# Patient Record
Sex: FEMALE
[De-identification: names, ages, dates, MRNs, and addresses within clinical notes are randomized per-mention and may not be internally consistent; named-entity substitution may affect disease eponyms.]

---

## 2019-08-21 ENCOUNTER — HOSPITAL ENCOUNTER (OUTPATIENT)
Dept: HOSPITAL 90 - RAH | Age: 79
Discharge: HOME | End: 2019-08-21
Attending: ORTHOPAEDIC SURGERY
Payer: COMMERCIAL
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
## 2019-08-21 DIAGNOSIS — M17.11: ICD-10-CM
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
## 2019-08-21 DIAGNOSIS — S83.271A: Primary | ICD-10-CM

## 2019-08-21 DIAGNOSIS — M25.461: ICD-10-CM
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
## 2019-08-21 PROCEDURE — 73721 MRI JNT OF LWR EXTRE W/O DYE: CPT

## 2019-10-04 VITALS — SYSTOLIC BLOOD PRESSURE: 152 MMHG | DIASTOLIC BLOOD PRESSURE: 72 MMHG

## 2019-10-04 LAB
APPEARANCE UR: (no result)
BASOPHILS NFR BLD AUTO: 0.9 % (ref 0–5)
BILIRUB UR QL STRIP: NEGATIVE
BUN SERPL-MCNC: 26 MG/DL (ref 7–18)
CHLORIDE SERPL-SCNC: 102 MMOL/L (ref 101–111)
CO2 SERPL-SCNC: 30 MMOL/L (ref 21–32)
COLOR UR: YELLOW
CREAT SERPL-MCNC: 1.4 MG/DL (ref 0.5–1.5)
EOSINOPHIL NFR BLD AUTO: 2.5 % (ref 0–8)
ERYTHROCYTE [DISTWIDTH] IN BLOOD BY AUTOMATED COUNT: 14.6 % (ref 11–15.5)
GFR SERPL CREATININE-BSD FRML MDRD: 39 ML/MIN (ref 60–?)
GLUCOSE SERPL-MCNC: 169 MG/DL (ref 70–105)
GLUCOSE UR STRIP-MCNC: NEGATIVE MG/DL
HCT VFR BLD AUTO: 39.1 % (ref 36–48)
HGB UR QL STRIP: (no result)
INR PPP: 0.93 (ref 0.85–1.15)
KETONES UR STRIP-MCNC: NEGATIVE MG/DL
LEUKOCYTE ESTERASE UR QL STRIP: (no result)
LYMPHOCYTES NFR SPEC AUTO: 16.3 % (ref 21–51)
MCH RBC QN AUTO: 28.1 PG (ref 27–33)
MCHC RBC AUTO-ENTMCNC: 32.9 G/DL (ref 32–36)
MCV RBC AUTO: 85.3 FL (ref 79–99)
MONOCYTES NFR BLD AUTO: 5.4 % (ref 3–13)
NEUTROPHILS NFR BLD AUTO: 74.9 % (ref 40–77)
NITRITE UR QL STRIP: NEGATIVE
NRBC BLD MANUAL-RTO: 0 % (ref 0–0.19)
PH UR STRIP: 6 [PH] (ref 5–8)
PLATELET # BLD AUTO: 204 K/UL (ref 130–400)
POTASSIUM SERPL-SCNC: 4.2 MMOL/L (ref 3.5–5.1)
PROT UR QL STRIP: 30 MG/DL
PROTHROMBIN TIME: 9.8 SEC (ref 9.6–11.6)
RBC # BLD AUTO: 4.59 MIL/UL (ref 4–5.5)
RBC #/AREA URNS HPF: (no result) /HPF (ref 0–1)
SODIUM SERPL-SCNC: 138 MMOL/L (ref 136–145)
SP GR UR STRIP: 1.01 (ref 1–1.03)
UROBILINOGEN UR STRIP-MCNC: 0.2 MG/DL (ref 0.2–1)
WBC # BLD AUTO: 7.1 K/UL (ref 4.8–10.8)
WBC #/AREA URNS HPF: (no result) /HPF (ref 0–1)

## 2019-10-04 NOTE — NUR
UA

INFORMED DR. JASSO OF ABNORMAL UA. ORDERS RECEIVED TO GIVE GENTAMICIN 240MG IV ON AM OF 
PROCEDURE AND SEND URINE FOR CULTURE.

## 2019-10-07 ENCOUNTER — HOSPITAL ENCOUNTER (OUTPATIENT)
Dept: HOSPITAL 90 - DAH | Age: 79
Setting detail: OBSERVATION
LOS: 2 days | Discharge: NURSING, REHAB OR LONG-TERM CARE | End: 2019-10-09
Attending: ORTHOPAEDIC SURGERY | Admitting: ORTHOPAEDIC SURGERY
Payer: COMMERCIAL

## 2019-10-07 VITALS — DIASTOLIC BLOOD PRESSURE: 65 MMHG | SYSTOLIC BLOOD PRESSURE: 132 MMHG

## 2019-10-07 VITALS — DIASTOLIC BLOOD PRESSURE: 60 MMHG | SYSTOLIC BLOOD PRESSURE: 124 MMHG

## 2019-10-07 VITALS — DIASTOLIC BLOOD PRESSURE: 63 MMHG | SYSTOLIC BLOOD PRESSURE: 134 MMHG

## 2019-10-07 VITALS — DIASTOLIC BLOOD PRESSURE: 69 MMHG | SYSTOLIC BLOOD PRESSURE: 133 MMHG

## 2019-10-07 VITALS — DIASTOLIC BLOOD PRESSURE: 57 MMHG | SYSTOLIC BLOOD PRESSURE: 107 MMHG

## 2019-10-07 VITALS — DIASTOLIC BLOOD PRESSURE: 58 MMHG | SYSTOLIC BLOOD PRESSURE: 108 MMHG

## 2019-10-07 VITALS — DIASTOLIC BLOOD PRESSURE: 62 MMHG | SYSTOLIC BLOOD PRESSURE: 111 MMHG

## 2019-10-07 VITALS — DIASTOLIC BLOOD PRESSURE: 71 MMHG | SYSTOLIC BLOOD PRESSURE: 136 MMHG

## 2019-10-07 VITALS — SYSTOLIC BLOOD PRESSURE: 135 MMHG | DIASTOLIC BLOOD PRESSURE: 64 MMHG

## 2019-10-07 VITALS — DIASTOLIC BLOOD PRESSURE: 63 MMHG | SYSTOLIC BLOOD PRESSURE: 139 MMHG

## 2019-10-07 VITALS — DIASTOLIC BLOOD PRESSURE: 62 MMHG | SYSTOLIC BLOOD PRESSURE: 119 MMHG

## 2019-10-07 VITALS — HEIGHT: 66 IN | WEIGHT: 213.4 LBS | BODY MASS INDEX: 34.3 KG/M2

## 2019-10-07 VITALS — DIASTOLIC BLOOD PRESSURE: 64 MMHG | SYSTOLIC BLOOD PRESSURE: 124 MMHG

## 2019-10-07 VITALS — DIASTOLIC BLOOD PRESSURE: 63 MMHG | SYSTOLIC BLOOD PRESSURE: 130 MMHG

## 2019-10-07 VITALS — SYSTOLIC BLOOD PRESSURE: 140 MMHG | DIASTOLIC BLOOD PRESSURE: 65 MMHG

## 2019-10-07 VITALS — SYSTOLIC BLOOD PRESSURE: 115 MMHG | DIASTOLIC BLOOD PRESSURE: 55 MMHG

## 2019-10-07 VITALS — DIASTOLIC BLOOD PRESSURE: 60 MMHG | SYSTOLIC BLOOD PRESSURE: 112 MMHG

## 2019-10-07 VITALS — DIASTOLIC BLOOD PRESSURE: 67 MMHG | SYSTOLIC BLOOD PRESSURE: 134 MMHG

## 2019-10-07 VITALS — DIASTOLIC BLOOD PRESSURE: 54 MMHG | SYSTOLIC BLOOD PRESSURE: 111 MMHG

## 2019-10-07 VITALS — DIASTOLIC BLOOD PRESSURE: 57 MMHG | SYSTOLIC BLOOD PRESSURE: 103 MMHG

## 2019-10-07 VITALS — SYSTOLIC BLOOD PRESSURE: 150 MMHG | DIASTOLIC BLOOD PRESSURE: 70 MMHG

## 2019-10-07 DIAGNOSIS — E78.5: ICD-10-CM

## 2019-10-07 DIAGNOSIS — N18.9: ICD-10-CM

## 2019-10-07 DIAGNOSIS — E66.9: ICD-10-CM

## 2019-10-07 DIAGNOSIS — R06.09: ICD-10-CM

## 2019-10-07 DIAGNOSIS — E11.22: ICD-10-CM

## 2019-10-07 DIAGNOSIS — M17.11: Primary | ICD-10-CM

## 2019-10-07 DIAGNOSIS — Z79.899: ICD-10-CM

## 2019-10-07 DIAGNOSIS — Z91.048: ICD-10-CM

## 2019-10-07 DIAGNOSIS — R60.0: ICD-10-CM

## 2019-10-07 DIAGNOSIS — Z23: ICD-10-CM

## 2019-10-07 DIAGNOSIS — R00.2: ICD-10-CM

## 2019-10-07 DIAGNOSIS — I13.0: ICD-10-CM

## 2019-10-07 DIAGNOSIS — Z79.4: ICD-10-CM

## 2019-10-07 DIAGNOSIS — Z88.0: ICD-10-CM

## 2019-10-07 DIAGNOSIS — I50.32: ICD-10-CM

## 2019-10-07 PROCEDURE — 96372 THER/PROPH/DIAG INJ SC/IM: CPT

## 2019-10-07 PROCEDURE — 87077 CULTURE AEROBIC IDENTIFY: CPT

## 2019-10-07 PROCEDURE — 97161 PT EVAL LOW COMPLEX 20 MIN: CPT

## 2019-10-07 PROCEDURE — 85025 COMPLETE CBC W/AUTO DIFF WBC: CPT

## 2019-10-07 PROCEDURE — 80048 BASIC METABOLIC PNL TOTAL CA: CPT

## 2019-10-07 PROCEDURE — 87641 MR-STAPH DNA AMP PROBE: CPT

## 2019-10-07 PROCEDURE — 96367 TX/PROPH/DG ADDL SEQ IV INF: CPT

## 2019-10-07 PROCEDURE — 96365 THER/PROPH/DIAG IV INF INIT: CPT

## 2019-10-07 PROCEDURE — 82948 REAGENT STRIP/BLOOD GLUCOSE: CPT

## 2019-10-07 PROCEDURE — 88311 DECALCIFY TISSUE: CPT

## 2019-10-07 PROCEDURE — 36415 COLL VENOUS BLD VENIPUNCTURE: CPT

## 2019-10-07 PROCEDURE — 81001 URINALYSIS AUTO W/SCOPE: CPT

## 2019-10-07 PROCEDURE — 87088 URINE BACTERIA CULTURE: CPT

## 2019-10-07 PROCEDURE — 97039 UNLISTED MODALITY: CPT

## 2019-10-07 PROCEDURE — 87186 SC STD MICRODIL/AGAR DIL: CPT

## 2019-10-07 PROCEDURE — 85610 PROTHROMBIN TIME: CPT

## 2019-10-07 PROCEDURE — 96366 THER/PROPH/DIAG IV INF ADDON: CPT

## 2019-10-07 PROCEDURE — 97116 GAIT TRAINING THERAPY: CPT

## 2019-10-07 PROCEDURE — 27447 TOTAL KNEE ARTHROPLASTY: CPT

## 2019-10-07 PROCEDURE — 97530 THERAPEUTIC ACTIVITIES: CPT

## 2019-10-07 PROCEDURE — 88304 TISSUE EXAM BY PATHOLOGIST: CPT

## 2019-10-07 RX ADMIN — CARVEDILOL SCH MG: 3.12 TABLET, FILM COATED ORAL at 21:05

## 2019-10-07 RX ADMIN — SODIUM CHLORIDE SCH UNIT: 9 INJECTION, SOLUTION INTRAVENOUS at 16:30

## 2019-10-07 RX ADMIN — CLINDAMYCIN PHOSPHATE SCH MLS/HR: 18 INJECTION, SOLUTION INTRAVENOUS at 05:00

## 2019-10-07 RX ADMIN — MONTELUKAST SODIUM SCH MG: 10 TABLET, FILM COATED ORAL at 21:04

## 2019-10-07 RX ADMIN — ATORVASTATIN CALCIUM SCH MG: 20 TABLET, FILM COATED ORAL at 21:05

## 2019-10-07 RX ADMIN — PREGABALIN SCH MG: 25 CAPSULE ORAL at 21:04

## 2019-10-07 RX ADMIN — ACETAMINOPHEN SCH MG: 500 TABLET, COATED ORAL at 21:09

## 2019-10-07 RX ADMIN — ACETAMINOPHEN SCH MG: 500 TABLET, COATED ORAL at 15:30

## 2019-10-07 RX ADMIN — CLINDAMYCIN PHOSPHATE SCH: 18 INJECTION, SOLUTION INTRAVENOUS at 13:21

## 2019-10-07 RX ADMIN — SODIUM CHLORIDE SCH MLS/HR: 0.9 INJECTION, SOLUTION INTRAVENOUS at 15:23

## 2019-10-07 RX ADMIN — CLINDAMYCIN PHOSPHATE SCH MLS/HR: 18 INJECTION, SOLUTION INTRAVENOUS at 21:05

## 2019-10-07 RX ADMIN — ASPIRIN SCH MG: 81 TABLET, CHEWABLE ORAL at 21:04

## 2019-10-07 RX ADMIN — AMLODIPINE BESYLATE SCH MG: 5 TABLET ORAL at 21:04

## 2019-10-07 RX ADMIN — SODIUM CHLORIDE SCH UNIT: 9 INJECTION, SOLUTION INTRAVENOUS at 18:06

## 2019-10-07 RX ADMIN — LINAGLIPTIN SCH MG: 5 TABLET, FILM COATED ORAL at 18:15

## 2019-10-07 RX ADMIN — SODIUM CHLORIDE SCH UNIT: 9 INJECTION, SOLUTION INTRAVENOUS at 21:21

## 2019-10-07 RX ADMIN — CELECOXIB SCH MG: 200 CAPSULE ORAL at 21:04

## 2019-10-08 VITALS — DIASTOLIC BLOOD PRESSURE: 92 MMHG | SYSTOLIC BLOOD PRESSURE: 159 MMHG

## 2019-10-08 VITALS — SYSTOLIC BLOOD PRESSURE: 130 MMHG | DIASTOLIC BLOOD PRESSURE: 51 MMHG

## 2019-10-08 VITALS — SYSTOLIC BLOOD PRESSURE: 106 MMHG | DIASTOLIC BLOOD PRESSURE: 41 MMHG

## 2019-10-08 VITALS — DIASTOLIC BLOOD PRESSURE: 65 MMHG | SYSTOLIC BLOOD PRESSURE: 109 MMHG

## 2019-10-08 VITALS — DIASTOLIC BLOOD PRESSURE: 45 MMHG | SYSTOLIC BLOOD PRESSURE: 115 MMHG

## 2019-10-08 VITALS — SYSTOLIC BLOOD PRESSURE: 109 MMHG | DIASTOLIC BLOOD PRESSURE: 48 MMHG

## 2019-10-08 LAB
BUN SERPL-MCNC: 25 MG/DL (ref 7–18)
CHLORIDE SERPL-SCNC: 102 MMOL/L (ref 101–111)
CO2 SERPL-SCNC: 24 MMOL/L (ref 21–32)
CREAT SERPL-MCNC: 1.4 MG/DL (ref 0.5–1.5)
GFR SERPL CREATININE-BSD FRML MDRD: 39 ML/MIN (ref 60–?)
GLUCOSE SERPL-MCNC: 321 MG/DL (ref 70–105)
POTASSIUM SERPL-SCNC: 4.9 MMOL/L (ref 3.5–5.1)
SODIUM SERPL-SCNC: 137 MMOL/L (ref 136–145)

## 2019-10-08 RX ADMIN — ACETAMINOPHEN SCH MG: 500 TABLET, COATED ORAL at 22:42

## 2019-10-08 RX ADMIN — SODIUM CHLORIDE SCH UNIT: 9 INJECTION, SOLUTION INTRAVENOUS at 17:21

## 2019-10-08 RX ADMIN — CARVEDILOL SCH MG: 3.12 TABLET, FILM COATED ORAL at 21:10

## 2019-10-08 RX ADMIN — CLINDAMYCIN PHOSPHATE SCH MLS/HR: 18 INJECTION, SOLUTION INTRAVENOUS at 05:35

## 2019-10-08 RX ADMIN — OXYCODONE HYDROCHLORIDE PRN MG: 5 TABLET ORAL at 22:44

## 2019-10-08 RX ADMIN — ACETAMINOPHEN SCH MG: 500 TABLET, COATED ORAL at 13:48

## 2019-10-08 RX ADMIN — SODIUM CHLORIDE SCH UNIT: 9 INJECTION, SOLUTION INTRAVENOUS at 21:18

## 2019-10-08 RX ADMIN — DULOXETINE HYDROCHLORIDE SCH MG: 30 CAPSULE, DELAYED RELEASE ORAL at 09:44

## 2019-10-08 RX ADMIN — SODIUM CHLORIDE SCH UNIT: 9 INJECTION, SOLUTION INTRAVENOUS at 05:34

## 2019-10-08 RX ADMIN — ASPIRIN SCH MG: 81 TABLET, CHEWABLE ORAL at 09:43

## 2019-10-08 RX ADMIN — SODIUM CHLORIDE SCH UNIT: 9 INJECTION, SOLUTION INTRAVENOUS at 17:24

## 2019-10-08 RX ADMIN — SODIUM CHLORIDE SCH UNIT: 9 INJECTION, SOLUTION INTRAVENOUS at 12:54

## 2019-10-08 RX ADMIN — ATORVASTATIN CALCIUM SCH MG: 20 TABLET, FILM COATED ORAL at 21:10

## 2019-10-08 RX ADMIN — SODIUM CHLORIDE SCH MLS/HR: 0.9 INJECTION, SOLUTION INTRAVENOUS at 00:45

## 2019-10-08 RX ADMIN — MONTELUKAST SODIUM SCH MG: 10 TABLET, FILM COATED ORAL at 21:10

## 2019-10-08 RX ADMIN — AMLODIPINE BESYLATE SCH MG: 5 TABLET ORAL at 21:11

## 2019-10-08 RX ADMIN — SODIUM CHLORIDE SCH UNIT: 9 INJECTION, SOLUTION INTRAVENOUS at 11:30

## 2019-10-08 RX ADMIN — PREGABALIN SCH MG: 25 CAPSULE ORAL at 09:44

## 2019-10-08 RX ADMIN — CHLORTHALIDONE SCH EACH: 50 TABLET ORAL at 09:00

## 2019-10-08 RX ADMIN — LINAGLIPTIN SCH MG: 5 TABLET, FILM COATED ORAL at 09:45

## 2019-10-08 RX ADMIN — ACETAMINOPHEN SCH MG: 500 TABLET, COATED ORAL at 05:35

## 2019-10-08 RX ADMIN — SODIUM CHLORIDE SCH UNIT: 9 INJECTION, SOLUTION INTRAVENOUS at 16:30

## 2019-10-08 RX ADMIN — CELECOXIB SCH MG: 200 CAPSULE ORAL at 21:10

## 2019-10-08 RX ADMIN — Medication SCH MG: at 09:45

## 2019-10-08 RX ADMIN — PREGABALIN SCH MG: 25 CAPSULE ORAL at 21:10

## 2019-10-08 RX ADMIN — SODIUM CHLORIDE SCH MLS/HR: 0.9 INJECTION, SOLUTION INTRAVENOUS at 11:23

## 2019-10-08 RX ADMIN — Medication SCH GM: at 09:45

## 2019-10-08 RX ADMIN — OXYCODONE HYDROCHLORIDE PRN MG: 5 TABLET ORAL at 13:49

## 2019-10-08 RX ADMIN — CELECOXIB SCH MG: 200 CAPSULE ORAL at 09:43

## 2019-10-08 RX ADMIN — Medication SCH MG: at 21:10

## 2019-10-08 RX ADMIN — ASPIRIN SCH MG: 81 TABLET, CHEWABLE ORAL at 21:10

## 2019-10-08 RX ADMIN — SODIUM CHLORIDE SCH UNIT: 9 INJECTION, SOLUTION INTRAVENOUS at 08:06

## 2019-10-08 RX ADMIN — CARVEDILOL SCH MG: 3.12 TABLET, FILM COATED ORAL at 09:44

## 2019-10-08 RX ADMIN — FAMOTIDINE SCH MG: 20 TABLET, FILM COATED ORAL at 09:45

## 2019-10-08 NOTE — NUR
INITIAL AND ASSESSMENT

MET W PATIENT WITH FRIEND AT BEDSIDE- PT AOOX 4, LIVES W SON BUT KENTRELLE IS ALWAYS WORKING., 
ORDER FOR  [facility], PT STATES WANTS TO GO TO [facility]- ADVISED THAT INSURANCE DOES NOT COVER- 



 PT CHOICE HERIBERTO ROSA HERE TO SEE PT, MERRITTAL FAXED INCLUDED PASSR, LACKING PT NOTES, TO 
FOLLOW 

-------------------------------------------------------------------------------

Addendum: 10/09/19 at 1953 by EUSEBIO CAMACHO RN CM

-------------------------------------------------------------------------------

Amended: Links added.

## 2019-10-09 VITALS — DIASTOLIC BLOOD PRESSURE: 58 MMHG | SYSTOLIC BLOOD PRESSURE: 122 MMHG

## 2019-10-09 VITALS — DIASTOLIC BLOOD PRESSURE: 67 MMHG | SYSTOLIC BLOOD PRESSURE: 117 MMHG

## 2019-10-09 VITALS — SYSTOLIC BLOOD PRESSURE: 129 MMHG | DIASTOLIC BLOOD PRESSURE: 67 MMHG

## 2019-10-09 RX ADMIN — Medication SCH MG: at 08:30

## 2019-10-09 RX ADMIN — ASPIRIN SCH MG: 81 TABLET, CHEWABLE ORAL at 08:31

## 2019-10-09 RX ADMIN — CELECOXIB SCH MG: 200 CAPSULE ORAL at 08:30

## 2019-10-09 RX ADMIN — Medication SCH GM: at 08:31

## 2019-10-09 RX ADMIN — ACETAMINOPHEN SCH MG: 500 TABLET, COATED ORAL at 07:08

## 2019-10-09 RX ADMIN — PREGABALIN SCH MG: 25 CAPSULE ORAL at 08:31

## 2019-10-09 RX ADMIN — CARVEDILOL SCH MG: 3.12 TABLET, FILM COATED ORAL at 08:30

## 2019-10-09 RX ADMIN — CHLORTHALIDONE SCH EACH: 50 TABLET ORAL at 08:56

## 2019-10-09 RX ADMIN — SODIUM CHLORIDE SCH UNIT: 9 INJECTION, SOLUTION INTRAVENOUS at 06:51

## 2019-10-09 RX ADMIN — SODIUM CHLORIDE SCH UNIT: 9 INJECTION, SOLUTION INTRAVENOUS at 06:52

## 2019-10-09 RX ADMIN — SODIUM CHLORIDE SCH UNIT: 9 INJECTION, SOLUTION INTRAVENOUS at 13:22

## 2019-10-09 RX ADMIN — DULOXETINE HYDROCHLORIDE SCH MG: 30 CAPSULE, DELAYED RELEASE ORAL at 08:30

## 2019-10-09 RX ADMIN — SODIUM CHLORIDE SCH UNIT: 9 INJECTION, SOLUTION INTRAVENOUS at 13:21

## 2019-10-09 RX ADMIN — FAMOTIDINE SCH MG: 20 TABLET, FILM COATED ORAL at 08:30

## 2019-10-09 RX ADMIN — LINAGLIPTIN SCH MG: 5 TABLET, FILM COATED ORAL at 08:30

## 2019-10-09 NOTE — NUR
Report was given to nurse Gi Ruiz

-------------------------------------------------------------------------------

Addendum: 10/09/19 at 1808 by VIRI THOMSON RN

-------------------------------------------------------------------------------

Gi said that she will send the  to  pt at around 1530